# Patient Record
Sex: FEMALE | Race: OTHER | URBAN - METROPOLITAN AREA
[De-identification: names, ages, dates, MRNs, and addresses within clinical notes are randomized per-mention and may not be internally consistent; named-entity substitution may affect disease eponyms.]

---

## 2022-03-18 PROBLEM — Z30.09 CONTRACEPTIVE EDUCATION: Status: ACTIVE | Noted: 2018-06-18

## 2022-03-19 PROBLEM — Z83.49 FAMILY HISTORY OF THYROID DISEASE: Status: ACTIVE | Noted: 2019-02-21

## 2022-03-20 PROBLEM — Z01.419 WOMEN'S ANNUAL ROUTINE GYNECOLOGICAL EXAMINATION: Status: ACTIVE | Noted: 2019-06-28

## 2022-07-13 ENCOUNTER — TELEPHONE (OUTPATIENT)
Dept: OBGYN CLINIC | Age: 27
End: 2022-07-13

## 2022-07-13 RX ORDER — NORGESTIMATE AND ETHINYL ESTRADIOL 0.25-0.035
1 KIT ORAL DAILY
Qty: 1 PACKET | Refills: 0 | Status: SHIPPED | OUTPATIENT
Start: 2022-07-13 | End: 2022-08-10

## 2022-07-13 NOTE — TELEPHONE ENCOUNTER
Patient calls stating she needs a rx refill for her ocps before her annual exam with dr. Lynn Chaves on 8/1/2022. One pack sent for patient to pharmacy on file.

## 2024-12-09 NOTE — PROGRESS NOTES
Patient here for AE.   Patient denies issues/concerns just wanted to let us know that her and her  have now started TTC for the last 2-3 months.     LAST PAP:  7/29/2021, neg.     LAST MAMMO:  never     LMP:  Patient's last menstrual period was 11/28/2024 (exact date).    BIRTH CONTROL:  none-TTC     TOBACCO USE:  No    FAMILY HISTORY OF:   Breast Cancer:  No   Ovarian Cancer:  No   Uterine Cancer:  No   Colon Cancer:  No    Vitals:    12/10/24 1000   BP: 108/62   Site: Left Upper Arm   Position: Sitting   Weight: 60.3 kg (133 lb)   Height: 1.626 m (5' 4\")        RASHAAD COCHRAN RN  12/10/24  10:08 AM

## 2024-12-10 ENCOUNTER — OFFICE VISIT (OUTPATIENT)
Dept: OBGYN CLINIC | Age: 29
End: 2024-12-10
Payer: COMMERCIAL

## 2024-12-10 VITALS
DIASTOLIC BLOOD PRESSURE: 62 MMHG | BODY MASS INDEX: 22.71 KG/M2 | WEIGHT: 133 LBS | HEIGHT: 64 IN | SYSTOLIC BLOOD PRESSURE: 108 MMHG

## 2024-12-10 DIAGNOSIS — Z01.419 WOMEN'S ANNUAL ROUTINE GYNECOLOGICAL EXAMINATION: ICD-10-CM

## 2024-12-10 DIAGNOSIS — Z12.4 PAP SMEAR FOR CERVICAL CANCER SCREENING: Primary | ICD-10-CM

## 2024-12-10 PROCEDURE — 99459 PELVIC EXAMINATION: CPT | Performed by: NURSE PRACTITIONER

## 2024-12-10 PROCEDURE — 99385 PREV VISIT NEW AGE 18-39: CPT | Performed by: NURSE PRACTITIONER

## 2024-12-10 SDOH — ECONOMIC STABILITY: FOOD INSECURITY: WITHIN THE PAST 12 MONTHS, THE FOOD YOU BOUGHT JUST DIDN'T LAST AND YOU DIDN'T HAVE MONEY TO GET MORE.: NEVER TRUE

## 2024-12-10 SDOH — ECONOMIC STABILITY: FOOD INSECURITY: WITHIN THE PAST 12 MONTHS, YOU WORRIED THAT YOUR FOOD WOULD RUN OUT BEFORE YOU GOT MONEY TO BUY MORE.: NEVER TRUE

## 2024-12-10 SDOH — ECONOMIC STABILITY: INCOME INSECURITY: HOW HARD IS IT FOR YOU TO PAY FOR THE VERY BASICS LIKE FOOD, HOUSING, MEDICAL CARE, AND HEATING?: NOT HARD AT ALL

## 2024-12-10 ASSESSMENT — PATIENT HEALTH QUESTIONNAIRE - PHQ9
1. LITTLE INTEREST OR PLEASURE IN DOING THINGS: NOT AT ALL
SUM OF ALL RESPONSES TO PHQ QUESTIONS 1-9: 0
SUM OF ALL RESPONSES TO PHQ9 QUESTIONS 1 & 2: 0
2. FEELING DOWN, DEPRESSED OR HOPELESS: NOT AT ALL

## 2024-12-10 NOTE — PROGRESS NOTES
Chaperone for Intimate Exam     Chaperone was offer accepted as part of the rooming process    Chaperone: Phyllis Burroughs

## 2024-12-10 NOTE — PROGRESS NOTES
Letitia éPrez is a 29 y.o. No obstetric history on file. who is here for AE        Patient's last menstrual period was 11/28/2024 (exact date).    Menses:Q 28 days, lasting 4 days    Menstrual Complaints: none    Birth Control:none, TTC x3 months    Date of last Cervical Cancer screen (HPV or PAP): 7/29/2021    Hx abnormal pap or STD:denies    No breast cancer screening on file    Fam Hx of Breast, ovarian or uterine cancer:denies    Sexually Active:yes            ROS:    Breast: Denies pain, lump or nipple discharge    GYN: Denies pelvic pain, discharge, itching, odor or dysuria.     Constitutional: Negative for chills and fever.     HENT: Negative for severe headaches or vision changes    Respiratory: Negative for cough and shortness of breath.      Cardiovascular: Negative for chest pain and palpitations.     Gastrointestinal: Negative for nausea and vomiting. Negative for diarrhea and constipation    Genitourinary: Negative for dysuria and hematuria.       Past Medical History:   Diagnosis Date    Seizure (HCC)     febrile seizure age 3       Past Surgical History:   Procedure Laterality Date    LASIK  11/2023       Family History   Problem Relation Age of Onset    Lupus Father     Thyroid Disease Mother     Hypertension Paternal Grandmother     Heart Attack Paternal Grandmother     Heart Disease Maternal Grandmother     Diabetes Maternal Grandmother     Colon Cancer Neg Hx     Breast Cancer Neg Hx     Uterine Cancer Neg Hx     Ovarian Cancer Neg Hx        Social History     Socioeconomic History    Marital status: Single     Spouse name: Not on file    Number of children: Not on file    Years of education: associates degree    Highest education level: Not on file   Occupational History    Not on file   Tobacco Use    Smoking status: Never    Smokeless tobacco: Never   Substance and Sexual Activity    Alcohol use: Yes     Alcohol/week: 2.0 standard drinks of alcohol     Types: 2 Glasses of wine per week

## 2024-12-10 NOTE — ASSESSMENT & PLAN NOTE
Pap today  Declines std screening  Mammo n/a    She and spouse TTC x3 months, below reviewed  Take a multivitamin that contains Folic Acid 400 mcg daily  We recommend a diet high in vegetables, fruits, whole grains, low fat dairy, and lean meats such as baked chicken and fish. Limit the amount of red meat, sweets, and sugary beverages (such as soda and sweet tea).   Exercise 30 minutes every day.  Drink no more than 200 mg of caffeine each day  Avoid alcohol, tobacco and drugs while trying to conceive  Time intercourse for every 1-2 days starting 5 days before ovulation until 1 day after ovulation.

## 2024-12-10 NOTE — PROGRESS NOTES
I have reviewed the patient's visit today including history, exam and assessment by DEIDRE Montes.  I agree with treatment/plan as above.    Harman Cook MD  10:53 AM  12/10/24

## 2024-12-10 NOTE — PATIENT INSTRUCTIONS
Take a multivitamin that contains Folic Acid 400 mcg daily  We recommend a diet high in vegetables, fruits, whole grains, low fat dairy, and lean meats such as baked chicken and fish. Limit the amount of red meat, sweets, and sugary beverages (such as soda and sweet tea).   Exercise 30 minutes every day.  Drink no more than 200 mg of caffeine each day  Avoid alcohol, tobacco and drugs while trying to conceive  Time intercourse for every 1-2 days starting 5 days before ovulation until 1 day after ovulation.

## 2024-12-12 LAB
COLLECTION METHOD: NORMAL
CYTOLOGIST CVX/VAG CYTO: NORMAL
CYTOLOGY CVX/VAG DOC THIN PREP: NORMAL
DATE OF LMP: NORMAL
HPV REFLEX: NORMAL
Lab: NORMAL
PAP SOURCE: NORMAL
PATH REPORT.FINAL DX SPEC: NORMAL
STAT OF ADQ CVX/VAG CYTO-IMP: NORMAL

## 2024-12-13 ENCOUNTER — TELEPHONE (OUTPATIENT)
Dept: OBGYN CLINIC | Age: 29
End: 2024-12-13

## 2025-06-26 ENCOUNTER — PROCEDURE VISIT (OUTPATIENT)
Dept: OBGYN CLINIC | Age: 30
End: 2025-06-26
Payer: COMMERCIAL

## 2025-06-26 ENCOUNTER — ROUTINE PRENATAL (OUTPATIENT)
Dept: OBGYN CLINIC | Age: 30
End: 2025-06-26

## 2025-06-26 VITALS
HEIGHT: 64 IN | DIASTOLIC BLOOD PRESSURE: 60 MMHG | BODY MASS INDEX: 23.05 KG/M2 | WEIGHT: 135 LBS | SYSTOLIC BLOOD PRESSURE: 102 MMHG

## 2025-06-26 DIAGNOSIS — Z34.01 PRIMIGRAVIDA, FIRST TRIMESTER: ICD-10-CM

## 2025-06-26 DIAGNOSIS — Z34.01 PRIMIGRAVIDA, FIRST TRIMESTER: Primary | ICD-10-CM

## 2025-06-26 DIAGNOSIS — O36.80X0 ENCOUNTER TO DETERMINE FETAL VIABILITY OF PREGNANCY, SINGLE OR UNSPECIFIED FETUS: Primary | ICD-10-CM

## 2025-06-26 DIAGNOSIS — Z34.01 PRIMIGRAVIDA IN FIRST TRIMESTER: ICD-10-CM

## 2025-06-26 PROBLEM — Z30.09 CONTRACEPTIVE EDUCATION: Status: RESOLVED | Noted: 2018-06-18 | Resolved: 2025-06-26

## 2025-06-26 PROBLEM — Z01.419 WOMEN'S ANNUAL ROUTINE GYNECOLOGICAL EXAMINATION: Status: RESOLVED | Noted: 2019-06-28 | Resolved: 2025-06-26

## 2025-06-26 LAB
ABO + RH BLD: NORMAL
BLOOD GROUP ANTIBODIES SERPL: NORMAL
ERYTHROCYTE [DISTWIDTH] IN BLOOD BY AUTOMATED COUNT: 12 % (ref 11.9–14.6)
EST. AVERAGE GLUCOSE BLD GHB EST-MCNC: 99 MG/DL
HBA1C MFR BLD: 5.1 % (ref 0–5.6)
HBV SURFACE AG SER QL: NONREACTIVE
HCT VFR BLD AUTO: 38.2 % (ref 35.8–46.3)
HCV AB SER QL: NONREACTIVE
HGB BLD-MCNC: 12.7 G/DL (ref 11.7–15.4)
HIV 1+2 AB+HIV1 P24 AG SERPL QL IA: NONREACTIVE
HIV 1/2 RESULT COMMENT: NORMAL
MCH RBC QN AUTO: 30.1 PG (ref 26.1–32.9)
MCHC RBC AUTO-ENTMCNC: 33.2 G/DL (ref 31.4–35)
MCV RBC AUTO: 90.5 FL (ref 82–102)
NRBC # BLD: 0 K/UL (ref 0–0.2)
PLATELET # BLD AUTO: 258 K/UL (ref 150–450)
PMV BLD AUTO: 10.2 FL (ref 9.4–12.3)
RBC # BLD AUTO: 4.22 M/UL (ref 4.05–5.2)
RUBV IGG SERPL IA-ACNC: 122 IU/ML
T PALLIDUM AB SER QL IA: NONREACTIVE
T4 FREE SERPL-MCNC: 1.3 NG/DL (ref 0.9–1.7)
TSH, 3RD GENERATION: 1.81 UIU/ML (ref 0.27–4.2)
WBC # BLD AUTO: 6.5 K/UL (ref 4.3–11.1)

## 2025-06-26 PROCEDURE — 76801 OB US < 14 WKS SINGLE FETUS: CPT | Performed by: OBSTETRICS & GYNECOLOGY

## 2025-06-26 PROCEDURE — 0500F INITIAL PRENATAL CARE VISIT: CPT | Performed by: NURSE PRACTITIONER

## 2025-06-26 NOTE — ASSESSMENT & PLAN NOTE
History reviewed  PNV's ordered (if not already taking)  PN labs, UDS ordered  Problem list reviewed  OB physical completed if due  OB prenatal packet/education reviewed: Information included discusses general pregnancy topics, fetal development, weight gain, nutrition, breastfeeding, risky behaviors, WIC, vaccinations and hospital information.    RTO 4 weeks  PTL/labor precautions, FMC, and pregnancy warning signs reviewed.      D/W pt at length genetic testing that is recommended by ACOG -- NIPT, Quad screen (for trisomies and NTD), CF, SMA.  Brief discussion of these diseases reviewed (conditions that may increase risks, etiology, carrier states, fetal effects, treatment options) D/W pt that these are screening tests/carrier screening test only and are NOT mandatory. We also discuss false POS/false neg rates. It is her decision whether to have them done and how to proceed with the information afterwards.  All questions answered, pt understood and wishes to proceed with indicated tests.

## 2025-06-26 NOTE — PROGRESS NOTES
HPI    This is a 29 y.o.   at 9w4d for new OB visit.    Her Estimated Due Date is 2026, by Last Menstrual Period    Denies leaking of fluid, vaginal bleeding, or regular contractions.       Current Outpatient Medications on File Prior to Visit   Medication Sig Dispense Refill    Prenatal Vit-Fe Fumarate-FA (PRENATAL PO) Take by mouth      Acetaminophen (TYLENOL) 325 MG CAPS Take by mouth       No current facility-administered medications on file prior to visit.       No Known Allergies        OB History    Para Term  AB Living   1 0 0 0 0 0   SAB IAB Ectopic Molar Multiple Live Births   0 0 0 0 0 0       # 1 - Date: None, Sex: None, Weight: None, GA: None, Type: None, Apgar1: None, Apgar5: None, Living: None, Birth Comments: None          Past Medical History:   Diagnosis Date    Migraines     Seizure (HCC)     febrile seizure age 3       Past Surgical History:   Procedure Laterality Date    LASIK  2023       Family History   Problem Relation Age of Onset    Lupus Father     Thyroid Disease Mother     Hypertension Paternal Grandmother     Heart Attack Paternal Grandmother     Heart Disease Maternal Grandmother     Diabetes Maternal Grandmother     Colon Cancer Neg Hx     Breast Cancer Neg Hx     Uterine Cancer Neg Hx     Ovarian Cancer Neg Hx        Social History     Socioeconomic History    Marital status:      Spouse name: Not on file    Number of children: Not on file    Years of education: associates degree    Highest education level: Not on file   Occupational History    Not on file   Tobacco Use    Smoking status: Never    Smokeless tobacco: Never   Substance and Sexual Activity    Alcohol use: Yes     Alcohol/week: 2.0 standard drinks of alcohol     Types: 2 Glasses of wine per week    Drug use: No    Sexual activity: Yes     Partners: Male     Birth control/protection: None     Comment:    Other Topics Concern    Not on file   Social History Narrative    Abuse:

## 2025-06-26 NOTE — PROGRESS NOTES
Patient comes in today for initial prenatal visit.      Patient would like to discuss sleep aid recommendations. Patient states that since becoming pregnant her energy levels are lower.     Fetal Movements:  No  Contractions:  No  Vaginal Bleeding:  No  Leaking Fluid:  No  GI/ issues:  Yes-nausea every day for several weeks, and approx 1 once per week vomiting episode.     Drug/Alcohol 4P's Plus Screening    1.  Have either of your parents ever had a problem with drugs/alcohol/prescription drugs? No  2.  Does your partner have a problem with drugs/alcohol/prescription drugs?  No  3.  In the past, have you ever had a problem with drugs/alcohol/prescription drugs?  No  4.  Before you were pregnant, in the past month, have you done any drugs, drank any alcohol or abused any prescription drugs?    No  If \"YES\" to any of the above, please give further details:  N/a     LAST PAP:  12/10/2024, neg.    LAST MAMMO:  never     LMP:  Patient's last menstrual period was 04/20/2025.    FAMILY HISTORY OF:   Breast Cancer:  No   Ovarian Cancer:  No   Uterine Cancer:  No   Colon Cancer:  No    Vitals:    06/26/25 0816   BP: 102/60   BP Site: Left Upper Arm   Patient Position: Sitting   Weight: 61.2 kg (135 lb)   Height: 1.626 m (5' 4\")        RASHAAD COCHRAN RN  06/26/25  8:30 AM

## 2025-06-27 ENCOUNTER — TELEPHONE (OUTPATIENT)
Dept: OBGYN CLINIC | Age: 30
End: 2025-06-27

## 2025-07-01 ENCOUNTER — RESULTS FOLLOW-UP (OUTPATIENT)
Dept: OBGYN CLINIC | Age: 30
End: 2025-07-01

## 2025-07-01 LAB
HGB FRACT BLD ELPH-IMP: NORMAL
Lab: NORMAL
NTRA 22Q11.2 DELETION SYNDROME POPULATION-BASED RISK TEXT: NORMAL
NTRA 22Q11.2 DELETION SYNDROME RESULT TEXT: NORMAL
NTRA 22Q11.2 DELETION SYNDROME RISK SCORE TEXT: NORMAL
NTRA FETAL FRACTION: NORMAL
NTRA GENDER OF FETUS: NORMAL
NTRA MONOSOMY X AGE-BASED RISK TEXT: NORMAL
NTRA MONOSOMY X RESULT TEXT: NORMAL
NTRA MONOSOMY X RISK SCORE TEXT: NORMAL
NTRA TRIPLOIDY RESULT TEXT: NORMAL
NTRA TRISOMY 13 AGE-BASED RISK TEXT: NORMAL
NTRA TRISOMY 13 RESULT TEXT: NORMAL
NTRA TRISOMY 13 RISK SCORE TEXT: NORMAL
NTRA TRISOMY 18 AGE-BASED RISK TEXT: NORMAL
NTRA TRISOMY 18 RESULT TEXT: NORMAL
NTRA TRISOMY 18 RISK SCORE TEXT: NORMAL
NTRA TRISOMY 21 AGE-BASED RISK TEXT: NORMAL
NTRA TRISOMY 21 RESULT TEXT: NORMAL
NTRA TRISOMY 21 RISK SCORE TEXT: NORMAL

## 2025-07-03 LAB
Lab: NEGATIVE
Lab: NORMAL
NTRA CYSTIC FIBROSIS: NEGATIVE
NTRA DUCHENNE/BECKER MUSCULAR DYSTROPHY: NEGATIVE
NTRA FRAGILE X SYNDROME: NEGATIVE
NTRA SPINAL MUSCULAR ATROPHY: NEGATIVE

## 2025-07-07 NOTE — TELEPHONE ENCOUNTER
----- Message from Dr. Harman Cook MD sent at 7/4/2025  9:23 AM EDT -----  Please let pt know that all her genetic testing was normal/negative  If she wants to know -- it's a GIRL

## 2025-07-08 ENCOUNTER — TELEPHONE (OUTPATIENT)
Dept: OBGYN CLINIC | Age: 30
End: 2025-07-08

## 2025-07-08 NOTE — TELEPHONE ENCOUNTER
Zainab Munoz sent patient a [employer] message regarding results.     Last read by Letitia Pérez at 7:54AM on 7/3/2025

## 2025-07-08 NOTE — TELEPHONE ENCOUNTER
Patient lvm returning my call. Called patient back, informed patient that I was calling to discuss genetic testing but saw she had already seen the message from Zainab. Informed patient that since Zainab has left Dr. Cook has taken over her results and it was a duplicate. Patient voiced understanding.

## 2025-07-08 NOTE — TELEPHONE ENCOUNTER
----- Message from Dr. Harman oCok MD sent at 7/4/2025  9:23 AM EDT -----  Please let pt know that all her genetic testing was normal/negative  If she wants to know -- it's a GIRL

## 2025-07-22 SDOH — ECONOMIC STABILITY: FOOD INSECURITY: WITHIN THE PAST 12 MONTHS, YOU WORRIED THAT YOUR FOOD WOULD RUN OUT BEFORE YOU GOT MONEY TO BUY MORE.: NEVER TRUE

## 2025-07-22 SDOH — ECONOMIC STABILITY: FOOD INSECURITY: WITHIN THE PAST 12 MONTHS, THE FOOD YOU BOUGHT JUST DIDN'T LAST AND YOU DIDN'T HAVE MONEY TO GET MORE.: NEVER TRUE

## 2025-07-22 SDOH — ECONOMIC STABILITY: INCOME INSECURITY: IN THE LAST 12 MONTHS, WAS THERE A TIME WHEN YOU WERE NOT ABLE TO PAY THE MORTGAGE OR RENT ON TIME?: NO

## 2025-07-22 SDOH — ECONOMIC STABILITY: TRANSPORTATION INSECURITY
IN THE PAST 12 MONTHS, HAS THE LACK OF TRANSPORTATION KEPT YOU FROM MEDICAL APPOINTMENTS OR FROM GETTING MEDICATIONS?: NO

## 2025-07-22 SDOH — ECONOMIC STABILITY: TRANSPORTATION INSECURITY
IN THE PAST 12 MONTHS, HAS LACK OF TRANSPORTATION KEPT YOU FROM MEETINGS, WORK, OR FROM GETTING THINGS NEEDED FOR DAILY LIVING?: NO

## 2025-07-23 NOTE — PROGRESS NOTES
Patient comes in today for routine prenatal visit. No complaints/concerns today.     Fetal Movement: No  Contractions: No  Vaginal Bleeding: No  Leaking Fluid: No  GI/: No    Vitals:    07/25/25 0819   BP: 104/60   BP Site: Left Upper Arm   Patient Position: Sitting   Weight: 61.2 kg (135 lb)   Height: 1.626 m (5' 4\")

## 2025-07-24 PROBLEM — Z34.02 PRIMIGRAVIDA IN SECOND TRIMESTER: Status: ACTIVE | Noted: 2025-06-26

## 2025-07-25 ENCOUNTER — ROUTINE PRENATAL (OUTPATIENT)
Dept: OBGYN CLINIC | Age: 30
End: 2025-07-25

## 2025-07-25 VITALS
WEIGHT: 135 LBS | DIASTOLIC BLOOD PRESSURE: 60 MMHG | SYSTOLIC BLOOD PRESSURE: 104 MMHG | HEIGHT: 64 IN | BODY MASS INDEX: 23.05 KG/M2

## 2025-07-25 DIAGNOSIS — Z34.02 PRIMIGRAVIDA IN SECOND TRIMESTER: Primary | ICD-10-CM

## 2025-07-25 PROCEDURE — 0501F PRENATAL FLOW SHEET: CPT | Performed by: OBSTETRICS & GYNECOLOGY

## 2025-07-25 NOTE — PATIENT INSTRUCTIONS
Please contact the office or seek immediate care if you develop fever > 101.0, severe lower abdominal pain or heavy vaginal bleeding (soaking 2 or more pads per hour).   Thanks for coming to see us today and letting us take care of you!

## 2025-07-25 NOTE — PROGRESS NOTES
Chief Complaint   Patient presents with    Routine Prenatal Visit        This 29 y.o.  at 13w5d with Estimated Date of Delivery: 26 presents for routine prenatal visit. Patient has no complaints today. Pt reports no LOF, VB, ctx. Pt denies H/A, vision changes, abdom pain, N/V.    Vitals:    25 0819   BP: 104/60   BP Site: Left Upper Arm   Patient Position: Sitting   Weight: 61.2 kg (135 lb)   Height: 1.626 m (5' 4\")        Patient Active Problem List    Diagnosis Date Noted    Primigravida in second trimester 2025     Overview Note:     EDC by LMP confirmed by 9 3/7 week US    25: NIPT low risk, FEMALE, CF, SMA, DMD, Fragile X neg         Assessment & Plan Note:     Instructed pt to contact the office or seek immediate care if develops fever > 101.0, severe lower abdominal pain or heavy vaginal bleeding (soaking 2 or more pads per hour).         Family history of thyroid disease 2019      Problem List Items Addressed This Visit              Primigravida in second trimester - Primary    Instructed pt to contact the office or seek immediate care if develops fever > 101.0, severe lower abdominal pain or heavy vaginal bleeding (soaking 2 or more pads per hour).                Harman Cook MD   8:32 AM  25

## 2025-08-13 PROBLEM — Z34.03 PRIMIGRAVIDA IN THIRD TRIMESTER: Status: ACTIVE | Noted: 2025-06-26

## 2025-08-15 ENCOUNTER — ROUTINE PRENATAL (OUTPATIENT)
Dept: OBGYN CLINIC | Age: 30
End: 2025-08-15

## 2025-08-15 VITALS
WEIGHT: 139 LBS | DIASTOLIC BLOOD PRESSURE: 62 MMHG | BODY MASS INDEX: 23.73 KG/M2 | SYSTOLIC BLOOD PRESSURE: 106 MMHG | HEIGHT: 64 IN

## 2025-08-15 DIAGNOSIS — Z34.02 PRIMIGRAVIDA IN SECOND TRIMESTER: ICD-10-CM

## 2025-08-15 DIAGNOSIS — Z34.02 PRIMIGRAVIDA IN SECOND TRIMESTER: Primary | ICD-10-CM

## 2025-08-15 PROBLEM — Z83.49 FAMILY HISTORY OF THYROID DISEASE: Status: RESOLVED | Noted: 2019-02-21 | Resolved: 2025-08-15

## 2025-08-15 PROCEDURE — 0501F PRENATAL FLOW SHEET: CPT | Performed by: OBSTETRICS & GYNECOLOGY

## 2025-08-20 LAB
AFP INTERP SERPL-IMP: NORMAL
AFP MOM SERPL: 1.06
AFP SERPL-MCNC: 40.5 NG/ML
AGE AT DELIVERY: 29.4 YR
COMMENT: NORMAL
DONOR EGG?: NO
GA METHOD: NORMAL
GA: 16.7 WEEKS
IDDM PATIENT QL: NO
Lab: 139
Lab: NORMAL
MAT SCN FOR FETAL ABNORMALITIES SERPL: NORMAL
MULTIPLE PREGNANCY: NO
NEURAL TUBE DEFECT RISK FETUS: NORMAL
NUMBER OF FETUSES: NO
OTHER INDICATIONS: NO
PREVIOUSLY ELEVATED AFP (Y OR N): 16.5
PREVIOUSLY ELEVATED AFP (Y OR N): NO
PRIOR 1ST TRIM TESTING ?: NO
PRIOR 2ND TRIM TESTING ?: NO
PRIOR DS/NTD SCREEN CURRENT PREGNANCY?: NO
PRIOR PREGNANCY WITH DOWN SYNDROME (Y OR N): 1
PRIOR PREGNANCY WITH DOWN SYNDROME (Y OR N): NO
TYPE OF EGG DONOR: NORMAL